# Patient Record
Sex: MALE | Race: WHITE | NOT HISPANIC OR LATINO | URBAN - METROPOLITAN AREA
[De-identification: names, ages, dates, MRNs, and addresses within clinical notes are randomized per-mention and may not be internally consistent; named-entity substitution may affect disease eponyms.]

---

## 2017-02-26 ENCOUNTER — EMERGENCY (EMERGENCY)
Facility: HOSPITAL | Age: 37
LOS: 1 days | End: 2017-02-26
Payer: MEDICAID

## 2017-02-26 PROCEDURE — 99284 EMERGENCY DEPT VISIT MOD MDM: CPT

## 2017-02-26 PROCEDURE — 71020: CPT | Mod: 26

## 2019-12-07 ENCOUNTER — EMERGENCY (EMERGENCY)
Facility: HOSPITAL | Age: 39
LOS: 1 days | End: 2019-12-07
Admitting: EMERGENCY MEDICINE
Payer: MEDICAID

## 2019-12-07 PROCEDURE — 71045 X-RAY EXAM CHEST 1 VIEW: CPT | Mod: 26

## 2019-12-07 PROCEDURE — 99285 EMERGENCY DEPT VISIT HI MDM: CPT

## 2019-12-09 ENCOUNTER — APPOINTMENT (OUTPATIENT)
Dept: CARDIOLOGY | Facility: CLINIC | Age: 39
End: 2019-12-09
Payer: MEDICAID

## 2019-12-09 VITALS
OXYGEN SATURATION: 97 % | HEIGHT: 73 IN | BODY MASS INDEX: 27.57 KG/M2 | HEART RATE: 68 BPM | WEIGHT: 208 LBS | DIASTOLIC BLOOD PRESSURE: 76 MMHG | SYSTOLIC BLOOD PRESSURE: 130 MMHG

## 2019-12-09 DIAGNOSIS — R00.2 PALPITATIONS: ICD-10-CM

## 2019-12-09 DIAGNOSIS — F41.9 ANXIETY DISORDER, UNSPECIFIED: ICD-10-CM

## 2019-12-09 DIAGNOSIS — F17.200 NICOTINE DEPENDENCE, UNSPECIFIED, UNCOMPLICATED: ICD-10-CM

## 2019-12-09 DIAGNOSIS — Z80.7 FAMILY HISTORY OF OTHER MALIGNANT NEOPLASMS OF LYMPHOID, HEMATOPOIETIC AND RELATED TISSUES: ICD-10-CM

## 2019-12-09 PROBLEM — Z00.00 ENCOUNTER FOR PREVENTIVE HEALTH EXAMINATION: Status: ACTIVE | Noted: 2019-12-09

## 2019-12-09 PROCEDURE — 99204 OFFICE O/P NEW MOD 45 MIN: CPT

## 2019-12-09 PROCEDURE — 0296T: CPT

## 2019-12-09 RX ORDER — PSYLLIUM HUSK 0.4 G
CAPSULE ORAL
Refills: 0 | Status: ACTIVE | COMMUNITY

## 2019-12-09 NOTE — REASON FOR VISIT
[FreeTextEntry1] : Skip is a pleasant 39-year-old male with history of fluttering sensation in the chest.  He went to the ER at [facility] on 12/7/2019.  He was monitored for several hours.  He did have fluttering sensation while he was on telemetry but no arrhythmia was detected.  .  He continues to have fluttering sensation, usually at rest and in the evening when he lies down.  There is no associated dizziness TIA or syncope.\par \par The patient is very active, does not have exertional chest pain or shortness of breath.  EKG, chest x-ray and labs at [facility] were unremarkable.  Normal TSH.

## 2019-12-09 NOTE — ASSESSMENT
[FreeTextEntry1] : Reviewed today:\par -EKG 12/7/2019: Sinus rhythm, unremarkable.\par -Chest x-ray 12/7/2019: Unremarkable\par -Labs 12/7/2019: Normal TSH, CK, negative cardiac troponin.  Normal electrolytes.  Normal CBC.  Normal creatinine.

## 2019-12-09 NOTE — DISCUSSION/SUMMARY
[FreeTextEntry1] : It is possible that his fluttering sensation is not secondary to arrhythmia.  It may be just muscle twitching?  This should be confirmed with ZIO recording for 5 days or so.  The patient has stopped taking kratom supplements.\par \par Smoking cessation is essential.  Risks of continued smoking and benefits of cessation were discussed.  He is trying to quit.\par \par Check echocardiogram.  There is no clinical evidence of MVP.\par \par Patient has excellent exercise capacity.  No history of dizziness or syncope.  Continue active lifestyle.\par \par Check lipid profile.\par \par Thank you for this referral and allowing me to participate in the care of this patient.  If can be of any further help or  if you have any questions, please do not hesitate to contact me\par \par \par Sincerely,\par \par Darrel Varghese MD, FACC, RICHARD\par \par

## 2019-12-09 NOTE — PHYSICAL EXAM
[Normal Appearance] : normal appearance [General Appearance - Well Developed] : well developed [Well Groomed] : well groomed [No Deformities] : no deformities [General Appearance - Well Nourished] : well nourished [General Appearance - In No Acute Distress] : no acute distress [Normal Conjunctiva] : the conjunctiva exhibited no abnormalities [Eyelids - No Xanthelasma] : the eyelids demonstrated no xanthelasmas [Normal Oral Mucosa] : normal oral mucosa [No Oral Pallor] : no oral pallor [FreeTextEntry1] : No JVD.  No Carotid bruits [No Oral Cyanosis] : no oral cyanosis [Heart Rate And Rhythm] : heart rate and rhythm were normal [Heart Sounds] : normal S1 and S2 [Murmurs] : no murmurs present [Arterial Pulses Normal] : the arterial pulses were normal [Edema] : no peripheral edema present [Respiration, Rhythm And Depth] : normal respiratory rhythm and effort [Exaggerated Use Of Accessory Muscles For Inspiration] : no accessory muscle use [Auscultation Breath Sounds / Voice Sounds] : lungs were clear to auscultation bilaterally [Abdomen Soft] : soft [Abnormal Walk] : normal gait [Abdomen Tenderness] : non-tender [Gait - Sufficient For Exercise Testing] : the gait was sufficient for exercise testing [Cyanosis, Localized] : no localized cyanosis [Nail Clubbing] : no clubbing of the fingernails [Skin Turgor] : normal skin turgor [] : no rash [Skin Color & Pigmentation] : normal skin color and pigmentation [Oriented To Time, Place, And Person] : oriented to person, place, and time [Mood] : the mood was normal [Affect] : the affect was normal [No Anxiety] : not feeling anxious

## 2019-12-19 PROCEDURE — 0298T: CPT

## 2021-09-25 ENCOUNTER — OUTPATIENT (OUTPATIENT)
Dept: OUTPATIENT SERVICES | Facility: HOSPITAL | Age: 41
LOS: 1 days | End: 2021-09-25